# Patient Record
Sex: MALE
[De-identification: names, ages, dates, MRNs, and addresses within clinical notes are randomized per-mention and may not be internally consistent; named-entity substitution may affect disease eponyms.]

---

## 2019-12-17 ENCOUNTER — HOSPITAL ENCOUNTER (EMERGENCY)
Dept: HOSPITAL 5 - ED | Age: 29
LOS: 1 days | Discharge: HOME | End: 2019-12-18
Payer: SELF-PAY

## 2019-12-17 DIAGNOSIS — F12.10: ICD-10-CM

## 2019-12-17 DIAGNOSIS — F17.200: ICD-10-CM

## 2019-12-17 DIAGNOSIS — F15.929: Primary | ICD-10-CM

## 2019-12-17 LAB
BASOPHILS # (AUTO): 0.1 K/MM3 (ref 0–0.1)
BASOPHILS NFR BLD AUTO: 0.7 % (ref 0–1.8)
BENZODIAZEPINES SCREEN,URINE: (no result)
BILIRUB UR QL STRIP: (no result)
BLOOD UR QL VISUAL: (no result)
BUN SERPL-MCNC: 11 MG/DL (ref 9–20)
BUN/CREAT SERPL: 12 %
CALCIUM SERPL-MCNC: 10.3 MG/DL (ref 8.4–10.2)
EOSINOPHIL # BLD AUTO: 0 K/MM3 (ref 0–0.4)
EOSINOPHIL NFR BLD AUTO: 0.1 % (ref 0–4.3)
HCT VFR BLD CALC: 50.1 % (ref 35.5–45.6)
HEMOLYSIS INDEX: 40
HGB BLD-MCNC: 16.3 GM/DL (ref 11.8–15.2)
LYMPHOCYTES # BLD AUTO: 0.9 K/MM3 (ref 1.2–5.4)
LYMPHOCYTES NFR BLD AUTO: 12.3 % (ref 13.4–35)
MCHC RBC AUTO-ENTMCNC: 33 % (ref 32–34)
MCV RBC AUTO: 83 FL (ref 84–94)
METHADONE SCREEN,URINE: (no result)
MONOCYTES # (AUTO): 0.9 K/MM3 (ref 0–0.8)
MONOCYTES % (AUTO): 12.6 % (ref 0–7.3)
OPIATE SCREEN,URINE: (no result)
PH UR STRIP: 6 [PH] (ref 5–7)
PLATELET # BLD: 215 K/MM3 (ref 140–440)
PROT UR STRIP-MCNC: (no result) MG/DL
RBC # BLD AUTO: 6.06 M/MM3 (ref 3.65–5.03)
RBC #/AREA URNS HPF: 1 /HPF (ref 0–6)
UROBILINOGEN UR-MCNC: < 2 MG/DL (ref ?–2)
WBC #/AREA URNS HPF: 1 /HPF (ref 0–6)

## 2019-12-17 PROCEDURE — 80048 BASIC METABOLIC PNL TOTAL CA: CPT

## 2019-12-17 PROCEDURE — 81001 URINALYSIS AUTO W/SCOPE: CPT

## 2019-12-17 PROCEDURE — 36415 COLL VENOUS BLD VENIPUNCTURE: CPT

## 2019-12-17 PROCEDURE — 80307 DRUG TEST PRSMV CHEM ANLYZR: CPT

## 2019-12-17 PROCEDURE — 80320 DRUG SCREEN QUANTALCOHOLS: CPT

## 2019-12-17 PROCEDURE — 85025 COMPLETE CBC W/AUTO DIFF WBC: CPT

## 2019-12-17 PROCEDURE — 82550 ASSAY OF CK (CPK): CPT

## 2019-12-17 PROCEDURE — G0480 DRUG TEST DEF 1-7 CLASSES: HCPCS

## 2019-12-17 NOTE — EVENT NOTE
ED Screening Note


Date of service: 12/17/19


Time: 18:32


ED Screening Note: 


30 yo male states he smoked meth today for this first time and thing went left. 

He admits to having SI/HI thoughts all the time. 





This initial assessment/diagnostic orders/clinical plan/treatment(s) is/are 

subject to change based on patients health status, clinical progression and re-

assessment by fellow clinical providers in the ED. Further treatment and workup 

at subsequent clinical providers discretion. Patient/guardian urged not to elope

from the ED as their condition may be serious if not clinically assessed and 

managed. 





Initial orders include: 


Medical clearance

## 2019-12-17 NOTE — EMERGENCY DEPARTMENT REPORT
<SANGEETA CARDONA - Last Filed: 12/17/19 19:25>





ED Psych HPI





- General


Chief Complaint: Psych


Stated Complaint: INGESTION


Time Seen by Provider: 12/17/19 19:06


Source: patient, EMS


Mode of arrival: Ambulatory


Limitations: No Limitations





- History of Present Illness


Initial Comments: 





29-year-old male presents to the hospital complaining of "wigging out" smoking 

methamphetamines for the first time.  Patient also smokes marijuana.  He uses 

both drugs one hour prior to arrival.  He states that he is having 

hallucinations body is feeling funny like he can't sit still. Pt states the 

feeling has started to calm down.  Patient states he is feeling suicidal earlier

with history of 1 previous attempt in the past but denies feeling suicidal at 

this time.





- Related Data


                                Home Medications











 Medication  Instructions  Recorded  Confirmed  Last Taken


 


No Known Home Medications [No  09/01/15 09/01/15 Unknown





Reported Home Medications]    











                                    Allergies











Allergy/AdvReac Type Severity Reaction Status Date / Time


 


No Known Allergies Allergy   Unverified 09/01/15 21:32














ED Review of Systems


Comment: All other systems reviewed and negative





ED Past Medical Hx





- Past Medical History


Previous Medical History?: No





- Surgical History


Past Surgical History?: No





- Social History


Smoking Status: Current Every Day Smoker


Substance Use Type: Marijuana, Methamphetamines





- Medications


Home Medications: 


                                Home Medications











 Medication  Instructions  Recorded  Confirmed  Last Taken  Type


 


No Known Home Medications [No  09/01/15 09/01/15 Unknown History





Reported Home Medications]     














ED Physical Exam





- General


Limitations: No Limitations





- Other


Other exam information: 





General: No acute distress


Head: Atraumatic


Eyes: normal appearance


ENT: Moist mucous membranes


Neck: Normal appearance, no midline tenderness


Chest: Clear to auscultation bilaterally


CV: Regular rate and rhythm


Abdomen: Soft, normal bowel sounds, nontender, nondistended, no rebound or 

guarding


Back: Normal inspection


Extremity: Normal inspection infection, full range of motion


Neuro: Alert O x 3, no facial asymmetry, speech clear, no gross motor sensory 

deficit


Psych: Appropriate behavior


Skin: No rash





ED Medical Decision Making





- Medical Decision Making





pych hold ordered. 


pt under the influence and will need obs and reassessment for suicide intent. 





- Differential Diagnosis


drug abuse, suicidal ideation, psychosis





ED Disposition


Clinical Impression: 


Amphetamine intoxication


Qualifiers:


 Complication of substance-induced condition: with delirium Qualified Code(s): 

F15.921 - Other stimulant use, unspecified with intoxication delirium





Disposition: DC-01 TO HOME OR SELFCARE


Condition: Stable


Instructions:  Methamphetamine Abuse (ED)





<ELSIE FOSTER - Last Filed: 12/18/19 12:33>





ED Review of Systems


ROS: 


Stated complaint: INGESTION


Other details as noted in HPI








ED Course





                                   Vital Signs











  12/17/19 12/17/19 12/18/19





  18:07 20:15 02:05


 


Temperature 97.6 F 98.3 F 98.6 F


 


Pulse Rate 83 77 92 H


 


Respiratory 20 16 18





Rate   


 


Blood Pressure 135/88 146/87 122/75





[Right]   


 


O2 Sat by Pulse 99 98 100





Oximetry   














  12/18/19





  07:00


 


Temperature 98.9 F


 


Pulse Rate 79


 


Respiratory 18





Rate 


 


Blood Pressure 138/68





[Right] 


 


O2 Sat by Pulse 95





Oximetry 














- Reevaluation(s)


Reevaluation #1: 





12/18/19 12:30


Patient was seen by mental health assesses this morning.  Patient does admit to 

using his supply of amphetamines before selling it and states he was "tripping".

 Patient is no longer exhibiting any signs was recommended that the patient be 

discharged home with outpatient follow-up.





ED Medical Decision Making





- Lab Data


Result diagrams: 


                                 12/17/19 19:09





                                 12/18/19 04:27


Critical care attestation.: 


If time is entered above; I have spent that time in minutes in the direct care 

of this critically ill patient, excluding procedure time.








ED Disposition


Is pt being admited?: No


Does the pt Need Aspirin: No


Time of Disposition: 12:32

## 2019-12-18 VITALS — SYSTOLIC BLOOD PRESSURE: 138 MMHG | DIASTOLIC BLOOD PRESSURE: 68 MMHG

## 2019-12-18 LAB
BUN SERPL-MCNC: 12 MG/DL (ref 9–20)
BUN/CREAT SERPL: 13 %
CALCIUM SERPL-MCNC: 9.7 MG/DL (ref 8.4–10.2)
HEMOLYSIS INDEX: 6